# Patient Record
Sex: MALE | Race: ASIAN | NOT HISPANIC OR LATINO | Employment: OTHER | URBAN - METROPOLITAN AREA
[De-identification: names, ages, dates, MRNs, and addresses within clinical notes are randomized per-mention and may not be internally consistent; named-entity substitution may affect disease eponyms.]

---

## 2023-04-30 ENCOUNTER — HOSPITAL ENCOUNTER (EMERGENCY)
Facility: MEDICAL CENTER | Age: 63
End: 2023-04-30
Attending: EMERGENCY MEDICINE
Payer: MEDICAID

## 2023-04-30 ENCOUNTER — APPOINTMENT (OUTPATIENT)
Dept: RADIOLOGY | Facility: MEDICAL CENTER | Age: 63
End: 2023-04-30
Attending: EMERGENCY MEDICINE
Payer: MEDICAID

## 2023-04-30 VITALS
OXYGEN SATURATION: 93 % | SYSTOLIC BLOOD PRESSURE: 162 MMHG | HEIGHT: 66 IN | BODY MASS INDEX: 20.89 KG/M2 | RESPIRATION RATE: 19 BRPM | HEART RATE: 68 BPM | TEMPERATURE: 97.8 F | DIASTOLIC BLOOD PRESSURE: 85 MMHG | WEIGHT: 130 LBS

## 2023-04-30 DIAGNOSIS — K08.89 PAIN, DENTAL: ICD-10-CM

## 2023-04-30 LAB
ALBUMIN SERPL BCP-MCNC: 4.3 G/DL (ref 3.2–4.9)
ALBUMIN/GLOB SERPL: 1.2 G/DL
ALP SERPL-CCNC: 79 U/L (ref 30–99)
ALT SERPL-CCNC: 13 U/L (ref 2–50)
ANION GAP SERPL CALC-SCNC: 15 MMOL/L (ref 7–16)
AST SERPL-CCNC: 18 U/L (ref 12–45)
BASOPHILS # BLD AUTO: 0.3 % (ref 0–1.8)
BASOPHILS # BLD: 0.02 K/UL (ref 0–0.12)
BILIRUB SERPL-MCNC: 0.7 MG/DL (ref 0.1–1.5)
BUN SERPL-MCNC: 25 MG/DL (ref 8–22)
CALCIUM ALBUM COR SERPL-MCNC: 8.7 MG/DL (ref 8.5–10.5)
CALCIUM SERPL-MCNC: 8.9 MG/DL (ref 8.5–10.5)
CHLORIDE SERPL-SCNC: 106 MMOL/L (ref 96–112)
CO2 SERPL-SCNC: 22 MMOL/L (ref 20–33)
CREAT SERPL-MCNC: 1.1 MG/DL (ref 0.5–1.4)
EKG IMPRESSION: NORMAL
EOSINOPHIL # BLD AUTO: 0.12 K/UL (ref 0–0.51)
EOSINOPHIL NFR BLD: 1.5 % (ref 0–6.9)
ERYTHROCYTE [DISTWIDTH] IN BLOOD BY AUTOMATED COUNT: 44.3 FL (ref 35.9–50)
FLUAV RNA SPEC QL NAA+PROBE: NEGATIVE
FLUBV RNA SPEC QL NAA+PROBE: NEGATIVE
GFR SERPLBLD CREATININE-BSD FMLA CKD-EPI: 75 ML/MIN/1.73 M 2
GLOBULIN SER CALC-MCNC: 3.6 G/DL (ref 1.9–3.5)
GLUCOSE SERPL-MCNC: 116 MG/DL (ref 65–99)
HCT VFR BLD AUTO: 47.8 % (ref 42–52)
HGB BLD-MCNC: 15.8 G/DL (ref 14–18)
IMM GRANULOCYTES # BLD AUTO: 0.03 K/UL (ref 0–0.11)
IMM GRANULOCYTES NFR BLD AUTO: 0.4 % (ref 0–0.9)
LYMPHOCYTES # BLD AUTO: 2.12 K/UL (ref 1–4.8)
LYMPHOCYTES NFR BLD: 27.3 % (ref 22–41)
MCH RBC QN AUTO: 28.6 PG (ref 27–33)
MCHC RBC AUTO-ENTMCNC: 33.1 G/DL (ref 33.7–35.3)
MCV RBC AUTO: 86.6 FL (ref 81.4–97.8)
MONOCYTES # BLD AUTO: 0.55 K/UL (ref 0–0.85)
MONOCYTES NFR BLD AUTO: 7.1 % (ref 0–13.4)
NEUTROPHILS # BLD AUTO: 4.92 K/UL (ref 1.82–7.42)
NEUTROPHILS NFR BLD: 63.4 % (ref 44–72)
NRBC # BLD AUTO: 0 K/UL
NRBC BLD-RTO: 0 /100 WBC
PLATELET # BLD AUTO: 315 K/UL (ref 164–446)
PMV BLD AUTO: 9.8 FL (ref 9–12.9)
POTASSIUM SERPL-SCNC: 3.6 MMOL/L (ref 3.6–5.5)
PROT SERPL-MCNC: 7.9 G/DL (ref 6–8.2)
RBC # BLD AUTO: 5.52 M/UL (ref 4.7–6.1)
RSV RNA SPEC QL NAA+PROBE: NEGATIVE
SARS-COV-2 RNA RESP QL NAA+PROBE: NOTDETECTED
SODIUM SERPL-SCNC: 143 MMOL/L (ref 135–145)
SPECIMEN SOURCE: NORMAL
TROPONIN T SERPL-MCNC: 7 NG/L (ref 6–19)
WBC # BLD AUTO: 7.8 K/UL (ref 4.8–10.8)

## 2023-04-30 PROCEDURE — 85025 COMPLETE CBC W/AUTO DIFF WBC: CPT

## 2023-04-30 PROCEDURE — 84484 ASSAY OF TROPONIN QUANT: CPT

## 2023-04-30 PROCEDURE — 96374 THER/PROPH/DIAG INJ IV PUSH: CPT

## 2023-04-30 PROCEDURE — 700105 HCHG RX REV CODE 258: Mod: UD | Performed by: EMERGENCY MEDICINE

## 2023-04-30 PROCEDURE — 71045 X-RAY EXAM CHEST 1 VIEW: CPT

## 2023-04-30 PROCEDURE — 700111 HCHG RX REV CODE 636 W/ 250 OVERRIDE (IP): Mod: UD | Performed by: EMERGENCY MEDICINE

## 2023-04-30 PROCEDURE — 93005 ELECTROCARDIOGRAM TRACING: CPT | Performed by: EMERGENCY MEDICINE

## 2023-04-30 PROCEDURE — 80053 COMPREHEN METABOLIC PANEL: CPT

## 2023-04-30 PROCEDURE — 99284 EMERGENCY DEPT VISIT MOD MDM: CPT

## 2023-04-30 PROCEDURE — 36415 COLL VENOUS BLD VENIPUNCTURE: CPT

## 2023-04-30 PROCEDURE — A9270 NON-COVERED ITEM OR SERVICE: HCPCS | Mod: UD | Performed by: EMERGENCY MEDICINE

## 2023-04-30 PROCEDURE — 96375 TX/PRO/DX INJ NEW DRUG ADDON: CPT

## 2023-04-30 PROCEDURE — 700102 HCHG RX REV CODE 250 W/ 637 OVERRIDE(OP): Mod: UD | Performed by: EMERGENCY MEDICINE

## 2023-04-30 PROCEDURE — 0241U HCHG SARS-COV-2 COVID-19 NFCT DS RESP RNA 4 TRGT MIC: CPT

## 2023-04-30 PROCEDURE — C9803 HOPD COVID-19 SPEC COLLECT: HCPCS | Performed by: EMERGENCY MEDICINE

## 2023-04-30 RX ORDER — SODIUM CHLORIDE 9 MG/ML
1000 INJECTION, SOLUTION INTRAVENOUS ONCE
Status: COMPLETED | OUTPATIENT
Start: 2023-04-30 | End: 2023-04-30

## 2023-04-30 RX ORDER — OXYCODONE HYDROCHLORIDE AND ACETAMINOPHEN 5; 325 MG/1; MG/1
2 TABLET ORAL ONCE
Status: COMPLETED | OUTPATIENT
Start: 2023-04-30 | End: 2023-04-30

## 2023-04-30 RX ORDER — MORPHINE SULFATE 4 MG/ML
4 INJECTION INTRAVENOUS ONCE
Status: COMPLETED | OUTPATIENT
Start: 2023-04-30 | End: 2023-04-30

## 2023-04-30 RX ORDER — HYDROCODONE BITARTRATE AND ACETAMINOPHEN 5; 325 MG/1; MG/1
1 TABLET ORAL EVERY 6 HOURS PRN
Qty: 15 TABLET | Refills: 0 | Status: SHIPPED | OUTPATIENT
Start: 2023-04-30 | End: 2023-05-04

## 2023-04-30 RX ORDER — ONDANSETRON 2 MG/ML
4 INJECTION INTRAMUSCULAR; INTRAVENOUS ONCE
Status: COMPLETED | OUTPATIENT
Start: 2023-04-30 | End: 2023-04-30

## 2023-04-30 RX ORDER — HYDROCODONE BITARTRATE AND ACETAMINOPHEN 5; 325 MG/1; MG/1
1 TABLET ORAL EVERY 6 HOURS PRN
Qty: 15 TABLET | Refills: 0 | Status: SHIPPED | OUTPATIENT
Start: 2023-04-30 | End: 2023-04-30 | Stop reason: SDUPTHER

## 2023-04-30 RX ADMIN — OXYCODONE AND ACETAMINOPHEN 2 TABLET: 5; 325 TABLET ORAL at 09:34

## 2023-04-30 RX ADMIN — MORPHINE SULFATE 4 MG: 4 INJECTION INTRAVENOUS at 07:40

## 2023-04-30 RX ADMIN — ONDANSETRON 4 MG: 2 INJECTION INTRAMUSCULAR; INTRAVENOUS at 07:40

## 2023-04-30 RX ADMIN — SODIUM CHLORIDE 1000 ML: 9 INJECTION, SOLUTION INTRAVENOUS at 07:40

## 2023-04-30 ASSESSMENT — PAIN DESCRIPTION - PAIN TYPE: TYPE: ACUTE PAIN

## 2023-04-30 NOTE — ED NOTES
Bedside report from Xi ORTIZ, pt aaox4. Call light within reach. Instructed to call staff for assistance.

## 2023-04-30 NOTE — ED NOTES
Reviewed discharge instructions, pt verbalized understanding of instructions and medication. States he will schedule follow-up appointment. Denies further questions at this time. Pt out of ED via wheelchair to be driven home by family.

## 2023-04-30 NOTE — ED PROVIDER NOTES
"ED Provider Note    CHIEF COMPLAINT  Chief Complaint   Patient presents with    Fever     PATIENT STATES FEVER AND CHILLS X 3 DAYS. PT DENIES N/V/D. NO HOME TESTS.      Shortness of Breath     INTERMITTENT SOB NO COUGH        EXTERNAL RECORDS REVIEWED  None none    HPI/ROS  LIMITATION TO HISTORY   None  OUTSIDE HISTORIAN(S):  Family    Dillan Hyatt is a 62 y.o. male who presents here for evaluation of body aches, and cough.  The patient states that he had oral surgery last week, was placed on antibiotics, and given something for pain, which was Advil.  Patient states he has a family of pain around his mouth, and has had a cough as well over the last few days.  He has no chest pain, and no abdominal pain.  Patient has no other medical concerns at this time, other than the pain around the mouth.  He had a surgery done in [place].  He reports some general body aches and not feeling well, but no definitive fevers reported.  He has not taken anything prior to or for the same.    PAST MEDICAL HISTORY   has a past medical history of Patient denies medical problems.    SURGICAL HISTORY  patient denies any surgical history    FAMILY HISTORY  History reviewed. No pertinent family history.    SOCIAL HISTORY  Social History     Tobacco Use    Smoking status: Former     Types: Cigarettes    Smokeless tobacco: Never   Vaping Use    Vaping Use: Never used   Substance and Sexual Activity    Alcohol use: Never    Drug use: Never    Sexual activity: Not on file       CURRENT MEDICATIONS  Home Medications    **Home medications have not yet been reviewed for this encounter**         ALLERGIES  No Known Allergies    PHYSICAL EXAM  VITAL SIGNS: BP (!) 176/81   Pulse 80   Temp 37.1 °C (98.8 °F)   Resp 16   Ht 1.676 m (5' 6\")   Wt 59 kg (130 lb)   SpO2 91%   BMI 20.98 kg/m²    Constitutional: Well developed, well nourished. No acute distress.  HEENT: Normocephalic, atraumatic. Posterior pharynx clear and moist.  Oral cavity is a " dentulous, no active bleeding, no noted abscess.  Tongue midline  Eyes:  EOMI. Normal sclera.  Neck: Supple, Full range of motion, nontender.  Chest/Pulmonary: clear to ausculation. Symmetrical expansion.   Cardio: Regular rate and rhythm with no murmur.   Musculoskeletal: No deformity, no edema, neurovascular intact.   Neuro: Clear speech, appropriate, cooperative, cranial nerves II-XII grossly intact.  Psych: Normal mood and affect      DIAGNOSTIC STUDIES / PROCEDURES  Results for orders placed or performed during the hospital encounter of 04/30/23   CBC With Differential   Result Value Ref Range    WBC 7.8 4.8 - 10.8 K/uL    RBC 5.52 4.70 - 6.10 M/uL    Hemoglobin 15.8 14.0 - 18.0 g/dL    Hematocrit 47.8 42.0 - 52.0 %    MCV 86.6 81.4 - 97.8 fL    MCH 28.6 27.0 - 33.0 pg    MCHC 33.1 (L) 33.7 - 35.3 g/dL    RDW 44.3 35.9 - 50.0 fL    Platelet Count 315 164 - 446 K/uL    MPV 9.8 9.0 - 12.9 fL    Neutrophils-Polys 63.40 44.00 - 72.00 %    Lymphocytes 27.30 22.00 - 41.00 %    Monocytes 7.10 0.00 - 13.40 %    Eosinophils 1.50 0.00 - 6.90 %    Basophils 0.30 0.00 - 1.80 %    Immature Granulocytes 0.40 0.00 - 0.90 %    Nucleated RBC 0.00 /100 WBC    Neutrophils (Absolute) 4.92 1.82 - 7.42 K/uL    Lymphs (Absolute) 2.12 1.00 - 4.80 K/uL    Monos (Absolute) 0.55 0.00 - 0.85 K/uL    Eos (Absolute) 0.12 0.00 - 0.51 K/uL    Baso (Absolute) 0.02 0.00 - 0.12 K/uL    Immature Granulocytes (abs) 0.03 0.00 - 0.11 K/uL    NRBC (Absolute) 0.00 K/uL   Comp Metabolic Panel   Result Value Ref Range    Sodium 143 135 - 145 mmol/L    Potassium 3.6 3.6 - 5.5 mmol/L    Chloride 106 96 - 112 mmol/L    Co2 22 20 - 33 mmol/L    Anion Gap 15.0 7.0 - 16.0    Glucose 116 (H) 65 - 99 mg/dL    Bun 25 (H) 8 - 22 mg/dL    Creatinine 1.10 0.50 - 1.40 mg/dL    Calcium 8.9 8.5 - 10.5 mg/dL    AST(SGOT) 18 12 - 45 U/L    ALT(SGPT) 13 2 - 50 U/L    Alkaline Phosphatase 79 30 - 99 U/L    Total Bilirubin 0.7 0.1 - 1.5 mg/dL    Albumin 4.3 3.2 - 4.9 g/dL     Total Protein 7.9 6.0 - 8.2 g/dL    Globulin 3.6 (H) 1.9 - 3.5 g/dL    A-G Ratio 1.2 g/dL   Troponin   Result Value Ref Range    Troponin T 7 6 - 19 ng/L   CoV-2, FLU A/B, and RSV by PCR (2-4 Hours CEPHEID) : Collect NP swab in VTM    Specimen: Respirate   Result Value Ref Range    Influenza virus A RNA Negative Negative    Influenza virus B, PCR Negative Negative    RSV, PCR Negative Negative    SARS-CoV-2 by PCR NotDetected     SARS-CoV-2 Source NP Swab    CORRECTED CALCIUM   Result Value Ref Range    Correct Calcium 8.7 8.5 - 10.5 mg/dL   ESTIMATED GFR   Result Value Ref Range    GFR (CKD-EPI) 75 >60 mL/min/1.73 m 2   EKG   Result Value Ref Range    Report       [facility] Emergency Dept.    Test Date:  2023  Pt Name:    NICHELLE RIVERA                      Department: ER  MRN:        [id number]                      Room:       [facility]  Gender:     Male                         Technician: [id number]  :        1960                   Requested By:CARLOS MONZON  Order #:    [id number]                    Reading MD:    Measurements  Intervals                                Axis  Rate:       75                           P:          13  VT:         140                          QRS:        -18  QRSD:       81                           T:          16  QT:         369  QTc:        413    Interpretive Statements  Sinus rhythm  Left ventricular hypertrophy  No previous ECG available for comparison       EKG; normal sinus rhythm at 75.  No ST elevation, no ST depression.  QTc is 413.  No previous EKG from comparison    RADIOLOGY  I have independently interpreted the diagnostic imaging associated with this visit and am waiting the final reading from the radiologist.   My preliminary interpretation is as follows: See below  Radiologist interpretation:   DX-CHEST-LIMITED (1 VIEW)   Final Result      1.  No acute cardiac or pulmonary abnormalities are identified.            COURSE & MEDICAL DECISION  MAKING        INITIAL ASSESSMENT, COURSE AND PLAN  Care Narrative: This is a 62-year-old male here for evaluation of oral pain status post dental surgery.  Patient initially said he was short of breath, because he was having pain, however his work-up here is negative.  His heart rate has been in the 60s and 70s since has been here, he has no shortness of breath, and no vomiting.  At this time I do not suspect PE, or MI.  He has been given pain medications orally, and is much more comfortable.          DISPOSITION AND DISCUSSIONS  I have discussed management of the patient with the following physicians and ALEXIA's: None    Discussion of management with other QHP or appropriate source(s): None    Escalation of care considered, and ultimately not performed: None    Barriers to care at this time, including but not limited to: Patient does not have established PCP.     Decision tools and prescription drugs considered including, but not limited to:  None .    FINAL DIAGNOSIS  1. Pain, dental           Electronically signed by: Pedro Gaitan D.O., 4/30/2023 7:38 AM

## 2023-04-30 NOTE — ED NOTES
Pt wheeled to red 1 from lobby with accompanied by sister and niece.  On monitor, call light in reach. Chart up for ERP.

## 2023-04-30 NOTE — ED TRIAGE NOTES
"Chief Complaint   Patient presents with    Fever     PATIENT STATES FEVER AND CHILLS X 3 DAYS. PT DENIES N/V/D. NO HOME TESTS.      Shortness of Breath     INTERMITTENT SOB NO COUGH            PT AMBULATORY  TO TRIAGE. Pt AA&O X 4, SEE ABOVE.     PT TO LOBBY . PT EDUCATED ON ALERTING STAFF TO CHANGES IN CONDITION. PT VERBALIZED AN UNDERSTANDING.     BP (!) 154/100   Pulse 87   Temp 37 °C (98.6 °F) (Temporal)   Resp 16   Ht 1.676 m (5' 6\")   Wt 59 kg (130 lb)   SpO2 97%   BMI 20.98 kg/m²     "

## 2023-04-30 NOTE — ED NOTES
Patient bedside report given to RN Cielo . Pt AAO X 4 , respirations even and unlabored, on room air. Family at bed side.